# Patient Record
Sex: FEMALE | Race: WHITE | NOT HISPANIC OR LATINO | URBAN - METROPOLITAN AREA
[De-identification: names, ages, dates, MRNs, and addresses within clinical notes are randomized per-mention and may not be internally consistent; named-entity substitution may affect disease eponyms.]

---

## 2024-02-25 ENCOUNTER — HOSPITAL ENCOUNTER (EMERGENCY)
Facility: HOSPITAL | Age: 19
Discharge: HOME | End: 2024-02-25
Attending: STUDENT IN AN ORGANIZED HEALTH CARE EDUCATION/TRAINING PROGRAM
Payer: COMMERCIAL

## 2024-02-25 VITALS
HEIGHT: 68 IN | SYSTOLIC BLOOD PRESSURE: 117 MMHG | HEART RATE: 78 BPM | OXYGEN SATURATION: 100 % | TEMPERATURE: 98 F | DIASTOLIC BLOOD PRESSURE: 77 MMHG | RESPIRATION RATE: 16 BRPM

## 2024-02-25 DIAGNOSIS — F41.9 ANXIETY: Primary | ICD-10-CM

## 2024-02-25 DIAGNOSIS — R42 DIZZINESS: ICD-10-CM

## 2024-02-25 PROCEDURE — 99283 EMERGENCY DEPT VISIT LOW MDM: CPT | Mod: 25

## 2024-02-25 PROCEDURE — 93010 ELECTROCARDIOGRAM REPORT: CPT | Mod: ,,, | Performed by: INTERNAL MEDICINE

## 2024-02-25 PROCEDURE — 25000003 PHARM REV CODE 250: Performed by: STUDENT IN AN ORGANIZED HEALTH CARE EDUCATION/TRAINING PROGRAM

## 2024-02-25 PROCEDURE — 93005 ELECTROCARDIOGRAM TRACING: CPT

## 2024-02-25 RX ORDER — LORAZEPAM 0.5 MG/1
0.5 TABLET ORAL ONCE
Status: COMPLETED | OUTPATIENT
Start: 2024-02-25 | End: 2024-02-25

## 2024-02-25 RX ORDER — DEXMETHYLPHENIDATE HYDROCHLORIDE 5 MG/1
5 TABLET ORAL
COMMUNITY
Start: 2023-12-18

## 2024-02-25 RX ADMIN — LORAZEPAM 0.5 MG: 0.5 TABLET ORAL at 10:02

## 2024-02-25 NOTE — Clinical Note
"Brenda Ahumadaia" Mallika was seen and treated in our emergency department on 2/25/2024.  She may return to school on 02/27/2024.      If you have any questions or concerns, please don't hesitate to call.      Gail MEDELLIN"

## 2024-02-25 NOTE — Clinical Note
"Brenda"Jaja Tena was seen and treated in our emergency department on 2/25/2024.  She may return to school on 02/28/2024.  Ms. Brenda Tena was seen in the ER today, please allow her any accommodations for missed assignments and absence until 02/28/2024 to recover from her illness.  Patient can return earlier if she has improved.    If you have any questions or concerns, please don't hesitate to call.      Edwina Bustamante MD"

## 2024-02-26 LAB
OHS QRS DURATION: 84 MS
OHS QTC CALCULATION: 415 MS

## 2024-02-26 NOTE — ED PROVIDER NOTES
Encounter Date: 2/25/2024       History     Chief Complaint   Patient presents with    Anxiety     Anxiety attack last 12 hours, and couldn't cope.      HPI    18-year-old woman with anxiety, ADHD, presenting with feelings of anxiety, racing thoughts, generalized tremors for the last 24 hours.  Reports she has been having difficulty sleeping for the last week.    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    Denies chest pain, denies recent sickness, denies difficulty breathing, denies headache/neck pain, numbness or weakness in the extremities at this time.    Denies SI/HI/AVH, no history of psychiatric hospitalization.  Denies any substance use.      Review of patient's allergies indicates:  No Known Allergies  History reviewed. No pertinent past medical history.  History reviewed. No pertinent surgical history.  History reviewed. No pertinent family history.     Review of Systems    Physical Exam     Initial Vitals [02/25/24 2007]   BP Pulse Resp Temp SpO2   (!) 130/93 85 19 98 °F (36.7 °C) 100 %      MAP       --         Physical Exam    Nursing note and vitals reviewed.  Constitutional: No distress.   HENT:   Head: Atraumatic.   Mouth/Throat: Oropharynx is clear and moist and mucous membranes are normal.   Eyes: Right conjunctiva is not injected. Left conjunctiva is not injected. No scleral icterus.   Cardiovascular:  Normal heart sounds.           Pulmonary/Chest: Effort normal and breath sounds normal. No respiratory distress. She has no wheezes.   Abdominal: Abdomen is soft. She exhibits no distension. There is no abdominal tenderness.     Neurological: No cranial nerve deficit. Gait normal.   Skin: Skin is warm. No ecchymosis and no rash noted.   Psychiatric:   Tearful on exam         ED Course   Procedures  Labs Reviewed - No data to display       Imaging Results    None          Medications    LORazepam tablet 0.5 mg (0.5 mg Oral Given 2/25/24 2201)     Medical Decision Making  18-year-old woman with anxiety, ADHD, presenting with feelings of anxiety, racing thoughts, generalized tremors for the last 24 hours.    Differential diagnosis includes:  Anxiety, somatic symptoms, panic attack, arrhythmia    EKG independently interpreted by me as below.  Patient tearful, anxious, with intermittent tremors to fingertips, and hyperventilation, which is improving since arrival to the ED. discussed lifestyle changes, stress management techniques, advised follow up with therapy, psychiatry, given 0.5 mg of p.o. Ativan in the ED, with significant improvement in anxiety.    No symptoms for thyroid abnormality at this time and history, with no other need for lab work or imaging in this patient at this time.    I discussed with the patient/family the diagnosis, treatment plan, indications for return to the emergency department, as well as for expected follow-up. The patient/family verbalized an understanding. The patient/family is asked if there were any questions or concerns, which were addressed to patient/family satisfaction. Patient/family understands and is agreeable to the plan.     DISCLAIMER: This note was prepared with [email or web address] voice recognition transcription software. Garbled syntax, mangled pronouns, and other bizarre constructions may be attributed to that software system.      Risk  Prescription drug management.               ED Course as of 02/26/24 0051   Sun Feb 25, 2024 2134 EKG independently interpreted by me with normal sinus rhythm, rate of 79, , no T-wave inversions, no STEMI, no acute signs of ischemia, no right heart strain [LF]      ED Course User Index  [LF] Edwina Bustamante MD                           Clinical Impression:  Final diagnoses:  [R42] Dizziness  [F41.9] Anxiety (Primary)          ED Disposition Condition    Discharge Stable          ED Prescriptions    None       Follow-up  Information    None          Edwina Bustamante MD  02/26/24 0056

## 2024-02-26 NOTE — ED TRIAGE NOTES
"Brenda Tena, an 18 y.o. female presents to the ED with c/o anxiety. Pt reports that she feels like she "has had an anxiety attack since 11 am." Denies SI/HI.      Chief Complaint   Patient presents with    Anxiety     Anxiety attack last 12 hours, and couldn't cope.      Review of patient's allergies indicates:  No Known Allergies  History reviewed. No pertinent past medical history.    Adult Physical Assessment  LOC: Brenda Tena, 18 y.o. female verified via two identifiers.  The patient is awake, alert, oriented and speaking appropriately at this time.  APPEARANCE: Patient resting comfortably and appears to be in no acute distress at this time. Patient is clean and well groomed, patient's clothing is properly fastened.  SKIN:The skin is warm and dry, color consistent with ethnicity, patient has normal skin turgor and moist mucus membranes, skin intact, no breakdown or brusing noted.  MUSCULOSKELETAL: Patient moving all extremities well, no obvious swelling or deformities noted.  RESPIRATORY: Airway is open and patent, respirations are spontaneous, patient has a normal effort and rate, no accessory muscle use noted.  CARDIAC: Patient has a normal rate and rhythm, no periphreal edema noted in any extremity, capillary refill < 3 seconds in all extremities  ABDOMEN: Soft and non tender to palpation, no abdominal distention noted. Bowel sounds present in all four quadrants.  NEUROLOGIC: Eyes open spontaneously, behavior appropriate to situation, follows commands, facial expression symmetrical,purposeful motor response noted.  "